# Patient Record
Sex: MALE | Race: BLACK OR AFRICAN AMERICAN | Employment: UNEMPLOYED | URBAN - METROPOLITAN AREA
[De-identification: names, ages, dates, MRNs, and addresses within clinical notes are randomized per-mention and may not be internally consistent; named-entity substitution may affect disease eponyms.]

---
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
## 2025-07-22 ENCOUNTER — APPOINTMENT (OUTPATIENT)
Facility: HOSPITAL | Age: 20
End: 2025-07-22
Payer: MEDICAID

## 2025-07-22 ENCOUNTER — HOSPITAL ENCOUNTER (EMERGENCY)
Facility: HOSPITAL | Age: 20
Discharge: HOME | End: 2025-07-22
Payer: MEDICAID

## 2025-07-22 VITALS
TEMPERATURE: 98 F | WEIGHT: 189.15 LBS | HEART RATE: 93 BPM | SYSTOLIC BLOOD PRESSURE: 143 MMHG | OXYGEN SATURATION: 98 % | DIASTOLIC BLOOD PRESSURE: 82 MMHG | RESPIRATION RATE: 20 BRPM

## 2025-07-22 DIAGNOSIS — M79.671 RIGHT FOOT PAIN: ICD-10-CM

## 2025-07-22 DIAGNOSIS — J02.9 SORE THROAT: Primary | ICD-10-CM

## 2025-07-22 LAB — S PYO DNA THROAT QL NAA+PROBE: NOT DETECTED

## 2025-07-22 PROCEDURE — 73630 X-RAY EXAM OF FOOT: CPT

## 2025-07-22 PROCEDURE — 87651 STREP A DNA AMP PROBE: CPT

## 2025-07-22 PROCEDURE — 99284 EMERGENCY DEPT VISIT MOD MDM: CPT

## 2025-07-22 RX ORDER — LIDOCAINE HYDROCHLORIDE 20 MG/ML
15 SOLUTION OROPHARYNGEAL PRN
Qty: 100 ML | Refills: 0 | Status: SHIPPED | OUTPATIENT
Start: 2025-07-22

## 2025-07-22 RX ORDER — CETIRIZINE HYDROCHLORIDE 10 MG/1
10 TABLET ORAL DAILY
COMMUNITY

## 2025-07-22 ASSESSMENT — PAIN DESCRIPTION - DESCRIPTORS: DESCRIPTORS: ACHING

## 2025-07-22 ASSESSMENT — PAIN - FUNCTIONAL ASSESSMENT: PAIN_FUNCTIONAL_ASSESSMENT: 0-10

## 2025-07-22 ASSESSMENT — PAIN DESCRIPTION - ORIENTATION: ORIENTATION: RIGHT

## 2025-07-22 ASSESSMENT — PAIN DESCRIPTION - PAIN TYPE: TYPE: ACUTE PAIN

## 2025-07-22 ASSESSMENT — PAIN SCALES - GENERAL: PAINLEVEL_OUTOF10: 5

## 2025-07-22 ASSESSMENT — PAIN DESCRIPTION - LOCATION: LOCATION: FOOT

## 2025-07-22 NOTE — ED PROVIDER NOTES
[facility] EMERGENCY DEPT  EMERGENCY DEPARTMENT HISTORY AND PHYSICAL EXAM      Date of evaluation: 7/22/2025  Patient Name: Loren Bolaños  Birthdate 2005  MRN: [id number]  ED Provider: Jocelyne Burns PA-C   Note Started: 12:45 PM EDT 7/22/25    HISTORY OF PRESENT ILLNESS     Chief Complaint   Patient presents with    Sore Throat    Foot Pain       History Provided By: Patient, only     HPI: Loren Bolaños is a 20 y.o. male presents to the ED for sore throat that began 2 days ago and right foot pain that began approximately 4 days ago.  No known sick contacts but patient reports he could have had a sick contact at work.  No dysphagia, difficulty managing secretions, neck pain or stiffness, trismus, or phonation changes.  No fevers, chills, cough, congestion, rhinorrhea, or bodyaches.  No headaches, dizziness, lightheadedness.  No GI symptoms,  symptoms, SOB, or CP.  Patient denies injury or trauma to his right foot.  He states that most of his pain is to his first digit.  He has not noticed any bruising, swelling, wounds, redness, or warmth.  He has never experienced anything like this in the past.  He has not taken any medications to address his symptoms.    PAST MEDICAL HISTORY   Past Medical History:  Past Medical History:   Diagnosis Date    ADHD (attention deficit hyperactivity disorder)     Asthma     Concussion     Constipation     H/O seasonal allergies     Head ache     Psychiatric disorder     adhd       Past Surgical History:  History reviewed. No pertinent surgical history.    Family History:  History reviewed. No pertinent family history.    Social History:  Social History     Tobacco Use    Smoking status: Passive Smoke Exposure - Never Smoker    Smokeless tobacco: Never   Substance Use Topics    Alcohol use: Never    Drug use: Never       Allergies:  No Known Allergies    PCP: Alyse Lee MD    Current Meds:   No current facility-administered medications for this encounter.     Current

## 2025-07-22 NOTE — DISCHARGE INSTRUCTIONS
Thank you for choosing our Emergency Department for your care.  It is our privilege to care for you in your time of need.  In the next several days, you may receive a survey via email or mailed to your home about your experience with our team.  We would greatly appreciate you taking a few minutes to complete the survey, as we use this information to learn what we have done well and what we could be doing better. Thank you for trusting us with your care!    Below you will find a list of your tests from today's visit.   Labs and Radiology Studies  Recent Results (from the past 12 hours)   Group A Strep by PCR    Collection Time: 07/22/25 12:07 PM    Specimen: Swab; Throat   Result Value Ref Range    Strep Grp A PCR Not detected NOTD       XR FOOT RIGHT (MIN 3 VIEWS)  Result Date: 7/22/2025  EXAM: XR FOOT RIGHT (MIN 3 VIEWS) INDICATION: Right foot first digit pain. COMPARISON: None. FINDINGS: Three views of the right foot demonstrate no acute fracture or dislocation. The joint spaces are maintained. The soft tissues are unremarkable.     No acute abnormality. Electronically signed by Obinna Reid    ------------------------------------------------------------------------------------------------------------  The evaluation and treatment you received in the Emergency Department were for an urgent problem. It is important that you follow-up with a doctor, nurse practitioner, or physician assistant to:  (1) confirm your diagnosis,  (2) re-evaluation of changes in your illness and treatment, and (3) for ongoing care. Please take your discharge instructions with you when you go to your follow-up appointment.     If you have any problem arranging a follow-up appointment, contact us!  If your symptoms become worse or you do not improve as expected, please return to us. We are available 24 hours a day.     If a prescription has been provided, please fill it as soon as possible to prevent a delay in treatment. If you have